# Patient Record
Sex: FEMALE | Race: WHITE | NOT HISPANIC OR LATINO | Employment: OTHER | URBAN - METROPOLITAN AREA
[De-identification: names, ages, dates, MRNs, and addresses within clinical notes are randomized per-mention and may not be internally consistent; named-entity substitution may affect disease eponyms.]

---

## 2017-07-21 ENCOUNTER — TELEPHONE (OUTPATIENT)
Dept: OBSTETRICS AND GYNECOLOGY | Facility: CLINIC | Age: 72
End: 2017-07-21

## 2017-07-21 DIAGNOSIS — Z12.31 ENCOUNTER FOR SCREENING MAMMOGRAM FOR BREAST CANCER: Primary | ICD-10-CM

## 2017-07-21 NOTE — TELEPHONE ENCOUNTER
----- Message from Rebekah Mason sent at 7/21/2017  1:04 PM CDT -----  Contact: Patient   Patient needs orders in for Mammogram to be schedule, Please call her at [phone].    Thanks  td

## 2017-08-15 ENCOUNTER — OFFICE VISIT (OUTPATIENT)
Dept: OTOLARYNGOLOGY | Facility: CLINIC | Age: 72
End: 2017-08-15
Payer: MEDICARE

## 2017-08-15 VITALS
BODY MASS INDEX: 25.43 KG/M2 | HEART RATE: 63 BPM | DIASTOLIC BLOOD PRESSURE: 71 MMHG | WEIGHT: 148.13 LBS | SYSTOLIC BLOOD PRESSURE: 125 MMHG

## 2017-08-15 DIAGNOSIS — H61.22 IMPACTED CERUMEN OF LEFT EAR: ICD-10-CM

## 2017-08-15 DIAGNOSIS — K12.0 APHTHOUS ULCER OF MOUTH: Primary | ICD-10-CM

## 2017-08-15 PROCEDURE — 1159F MED LIST DOCD IN RCRD: CPT | Mod: S$GLB,,, | Performed by: PHYSICIAN ASSISTANT

## 2017-08-15 PROCEDURE — 99999 PR PBB SHADOW E&M-EST. PATIENT-LVL III: CPT | Mod: PBBFAC,,, | Performed by: PHYSICIAN ASSISTANT

## 2017-08-15 PROCEDURE — 69210 REMOVE IMPACTED EAR WAX UNI: CPT | Mod: S$GLB,,, | Performed by: PHYSICIAN ASSISTANT

## 2017-08-15 PROCEDURE — 3074F SYST BP LT 130 MM HG: CPT | Mod: S$GLB,,, | Performed by: PHYSICIAN ASSISTANT

## 2017-08-15 PROCEDURE — 3078F DIAST BP <80 MM HG: CPT | Mod: S$GLB,,, | Performed by: PHYSICIAN ASSISTANT

## 2017-08-15 PROCEDURE — 99213 OFFICE O/P EST LOW 20 MIN: CPT | Mod: 25,S$GLB,, | Performed by: PHYSICIAN ASSISTANT

## 2017-08-15 PROCEDURE — 3008F BODY MASS INDEX DOCD: CPT | Mod: S$GLB,,, | Performed by: PHYSICIAN ASSISTANT

## 2017-08-15 PROCEDURE — 1125F AMNT PAIN NOTED PAIN PRSNT: CPT | Mod: S$GLB,,, | Performed by: PHYSICIAN ASSISTANT

## 2017-08-15 RX ORDER — TRIAMCINOLONE ACETONIDE 1 MG/G
PASTE DENTAL
Qty: 5 G | Refills: 3 | Status: SHIPPED | OUTPATIENT
Start: 2017-08-15

## 2017-08-15 RX ORDER — PAROXETINE 10 MG/1
10 TABLET, FILM COATED ORAL EVERY MORNING
COMMUNITY

## 2017-08-28 ENCOUNTER — HOSPITAL ENCOUNTER (OUTPATIENT)
Dept: RADIOLOGY | Facility: HOSPITAL | Age: 72
Discharge: HOME | End: 2017-08-28
Attending: OBSTETRICS & GYNECOLOGY
Payer: MEDICARE

## 2017-08-28 ENCOUNTER — OFFICE VISIT (OUTPATIENT)
Dept: OBSTETRICS AND GYNECOLOGY | Facility: CLINIC | Age: 72
End: 2017-08-28
Payer: MEDICARE

## 2017-08-28 VITALS
SYSTOLIC BLOOD PRESSURE: 116 MMHG | DIASTOLIC BLOOD PRESSURE: 70 MMHG | BODY MASS INDEX: 25.56 KG/M2 | HEIGHT: 64 IN | WEIGHT: 149.69 LBS

## 2017-08-28 DIAGNOSIS — Z01.419 WELL WOMAN EXAM WITH ROUTINE GYNECOLOGICAL EXAM: Primary | ICD-10-CM

## 2017-08-28 DIAGNOSIS — Z12.31 ENCOUNTER FOR SCREENING MAMMOGRAM FOR BREAST CANCER: ICD-10-CM

## 2017-08-28 PROCEDURE — G0101 CA SCREEN;PELVIC/BREAST EXAM: HCPCS | Mod: S$GLB,,, | Performed by: OBSTETRICS & GYNECOLOGY

## 2017-08-28 PROCEDURE — 77063 BREAST TOMOSYNTHESIS BI: CPT | Mod: 26,,, | Performed by: RADIOLOGY

## 2017-08-28 PROCEDURE — 99999 PR PBB SHADOW E&M-EST. PATIENT-LVL II: CPT | Mod: PBBFAC,,, | Performed by: OBSTETRICS & GYNECOLOGY

## 2017-08-28 PROCEDURE — 77067 SCR MAMMO BI INCL CAD: CPT | Mod: 26,,, | Performed by: RADIOLOGY

## 2017-08-28 PROCEDURE — 77067 SCR MAMMO BI INCL CAD: CPT | Mod: TC

## 2017-08-28 RX ORDER — BETAMETHASONE DIPROPIONATE 0.5 MG/G
CREAM TOPICAL
COMMUNITY
Start: 2017-07-08

## 2017-08-28 RX ORDER — MECLIZINE HYDROCHLORIDE 25 MG/1
TABLET ORAL
COMMUNITY
Start: 2017-08-03

## 2017-08-28 NOTE — PROGRESS NOTES
CHIEF COMPLAINT:   Gynecologic Exam  Chief Complaint   Patient presents with    Well Woman       HISTORY OF PRESENT ILLNESS  Patient presents for annual exam. The patient has no complaints today.     No LMP recorded. Patient is postmenopausal.  Postmenopausal    GYN screening history: last Pap: was normal. Never had any abnormal Pap smears in past.      Reports no bowel movement irregularities from baseline, bloating, or weight loss.    HRT:   None        Health Maintenance   Topic Date Due    TETANUS VACCINE  03/14/1962    DEXA SCAN  03/14/1984    Colonoscopy  03/14/1994    Zoster Vaccine  03/14/2004    Pneumococcal (65+) (1 of 2 - PCV13) 03/14/2009    Lipid Panel  11/19/2013    Influenza Vaccine  08/01/2017    Mammogram  08/02/2018       HISTORY  Patient Active Problem List   Diagnosis    Lymphocytic thyroiditis    Dysthymic disorder    Essential hypertension    Goiter    Hypothyroidism    Abnormal finding on thyroid function test    Non-toxic multinodular goiter    Malaise and fatigue       Past Medical History:   Diagnosis Date    Anxiety     Asthma     Bronchitis chronic     Depression     Emphysema of lung     Hypertension     Lung disease     Lymphocytic thyroiditis 2/7/2013    Non-Hodgkin lymphoma        Past Surgical History:   Procedure Laterality Date    BELPHAROPTOSIS REPAIR      BREAST BIOPSY Bilateral     benign    CATARACT EXTRACTION, BILATERAL      LYMPH NODE BIOPSY      TUBAL LIGATION         Family History   Problem Relation Age of Onset    Cancer Sister      breast cancer    Breast cancer Sister     Cancer Brother      multiple myeloma    Heart failure Brother     Cancer Brother     Heart failure Brother     Heart failure Brother     Heart failure Brother     Cancer Sister      breast cancer    Breast cancer Sister     Ovarian cancer Neg Hx        Social History     Social History    Marital status:      Spouse name: N/A    Number of children:  N/A    Years of education: N/A     Social History Main Topics    Smoking status: Former Smoker     Packs/day: 0.50     Years: 25.00     Types: Cigarettes     Quit date: 6/21/1998    Smokeless tobacco: Never Used    Alcohol use No    Drug use: No    Sexual activity: Not Asked      Comment: last sexually active 2011; last nl pap 2013     Other Topics Concern    None     Social History Narrative    None       Current Outpatient Prescriptions   Medication Sig Dispense Refill    ADVAIR DISKUS 250-50 mcg/dose diskus inhaler ONE PUFF TWICE DAILY. 60 each 11    amlodipine (NORVASC) 5 MG tablet Take 5 mg by mouth once daily.      augmented betamethasone dipropionate (DIPROLENE-AF) 0.05 % cream       lorazepam (ATIVAN) 0.5 MG tablet Take 0.5 mg by mouth 2 (two) times daily.  5    multivitamin (ONE DAILY MULTIVITAMIN) per tablet Take 1 tablet by mouth once daily.      paroxetine (PAXIL) 10 MG tablet Take 10 mg by mouth every morning.      triamcinolone acetonide 0.1% (KENALOG) 0.1 % paste Apply to oral ulcers three times a day 5 g 3    calcium carbonate (OS-GISELA) 600 mg (1,500 mg) Tab Take 600 mg by mouth 2 (two) times daily with meals.      fluticasone (FLONASE) 50 mcg/actuation nasal spray       meclizine (ANTIVERT) 25 mg tablet       omeprazole (PRILOSEC) 20 MG capsule Take 1 capsule (20 mg total) by mouth once daily. 90 capsule 4     No current facility-administered medications for this visit.        Review of patient's allergies indicates:   Allergen Reactions    Bactrim [sulfamethoxazole-trimethoprim] Hives    Sulfa (sulfonamide antibiotics) Hives and Other (See Comments)     Mouth ulcers           PHYSICAL EXAM     Vitals:    08/28/17 1328   BP: 116/70       PAIN SCALE: 0/10 None    ROS:  GENERAL: No fever, chills, fatigability or weight loss.  ABDOMEN: Appetite fine. No weight loss. Denies diarrhea, abdominal pain, hematemesis or blood in stool.  No change in bowel movement pattern.  URINARY: No  flank pain, dysuria or hematuria.  REPRODUCTIVE: No abnormal vaginal bleeding.  BREASTS: Breasts symmetric, nontender and no lumps detected.    PE:   APPEARANCE: Well nourished, well developed, in no acute distress.  NECK: Neck symmetric without masses or thyromegaly.    NODES: No inguinal lymph node enlargement.  ABDOMEN: Soft. No tenderness or masses. No hepatosplenomegaly. No hernias.  BREASTS: Symmetrical, no skin changes or visible lesions. No palpable masses, nipple discharge or adenopathy bilaterally.    PELVIC:   VULVA: No lesions.  Atrophic but normal female genitalia.  URETHRAL MEATUS: Normal size and location, no lesions, no prolapse.  URETHRA: No masses, tenderness, prolapse or scarring.  VAGINA: dry and narrow w/ atrophy, no discharge, no significant cystocele or rectocele.  CERVIX: No lesions, normal diameter, no stenosis, no cervical motion tenderness.  UTERUS: 8 week size, regular shape, mobile, non-tender, normal position, good support.  ADNEXA: No masses, tenderness or CDS nodularity.  ANUS PERINEUM: No lesions, no relaxation, external hemorrhoids noted.       DIAGNOSIS:   1. Normal gyn exam  2.  Physiologic atrophy    PLAN:   Mammogram    Colonoscopy  dexa      MEDICATIONS PRESCRIBED:   Calcium vitamin D and weight bearing exercise    COUNSELING:  Patient was counseled today on A.C.S. Pap guidelines and recommendations for yearly pelvic exams, mammograms and monthly self breast exams; to see her PCP for other health maintenance.     FOLLOW-UP: With me in 1 year.

## 2018-02-19 ENCOUNTER — HOSPITAL ENCOUNTER (EMERGENCY)
Facility: HOSPITAL | Age: 73
Discharge: HOME | End: 2018-02-19
Payer: MEDICARE

## 2018-02-19 ENCOUNTER — TELEPHONE (OUTPATIENT)
Dept: PULMONOLOGY | Facility: CLINIC | Age: 73
End: 2018-02-19

## 2018-02-19 VITALS
DIASTOLIC BLOOD PRESSURE: 71 MMHG | SYSTOLIC BLOOD PRESSURE: 129 MMHG | BODY MASS INDEX: 24.92 KG/M2 | HEIGHT: 64 IN | TEMPERATURE: 98 F | OXYGEN SATURATION: 96 % | HEART RATE: 72 BPM | WEIGHT: 146 LBS | RESPIRATION RATE: 16 BRPM

## 2018-02-19 DIAGNOSIS — R05.9 COUGH: Primary | ICD-10-CM

## 2018-02-19 DIAGNOSIS — M54.9 BACK PAIN: ICD-10-CM

## 2018-02-19 DIAGNOSIS — S22.089A CLOSED FRACTURE OF TWELFTH THORACIC VERTEBRA, UNSPECIFIED FRACTURE MORPHOLOGY, INITIAL ENCOUNTER: Primary | ICD-10-CM

## 2018-02-19 PROCEDURE — 99284 EMERGENCY DEPT VISIT MOD MDM: CPT

## 2018-02-19 RX ORDER — LEVOFLOXACIN 500 MG/1
500 TABLET, FILM COATED ORAL DAILY
Qty: 7 TABLET | Refills: 0 | Status: SHIPPED | OUTPATIENT
Start: 2018-02-19 | End: 2018-02-24

## 2018-02-19 RX ORDER — HYDROCODONE BITARTRATE AND ACETAMINOPHEN 10; 325 MG/1; MG/1
1 TABLET ORAL EVERY 4 HOURS PRN
Qty: 12 TABLET | Refills: 0 | Status: SHIPPED | OUTPATIENT
Start: 2018-02-19 | End: 2018-03-01

## 2018-02-19 NOTE — ED PROVIDER NOTES
SCRIBE #1 NOTE: I, Milind Sinclair, am scribing for, and in the presence of, JOESPH Arriaga. I have scribed the entire note.      History      Chief Complaint   Patient presents with    Fall     patient states she fell last week and was seen at the after hours clinic, c/o low back pain -LOC       Review of patient's allergies indicates:   Allergen Reactions    Bactrim [sulfamethoxazole-trimethoprim] Hives    Sulfa (sulfonamide antibiotics) Hives and Other (See Comments)     Mouth ulcers        HPI   HPI    2/19/2018, 3:51 PM   History obtained from the patient      History of Present Illness: Sherice Toledo is a 72 y.o. female patient who presents to the Emergency Department for lower back pain which onset gradually six days ago. Pt reports falling six days ago and being tx at after hours clinic. Pt reports increase pain one day ago. Symptoms are constant and moderate in severity. No mitigating or exacerbating factors reported. No associated sxs reported. Patient denies any fever, chills, SOB, CP, abd pain, n/v, neck pain/stiffness, HA, dizziness, light-headedness, LOC, extremity weakness/numbness, bowel/bladder incontinence, and all other sxs at this time. No prior Tx reported. No further complaints or concerns at this time.         Arrival mode: Personal vehicle    PCP: Chris Dorado MD       Past Medical History:  Past Medical History:   Diagnosis Date    Anxiety     Asthma     Bronchitis chronic     Depression     Emphysema of lung     Hypertension     Lung disease     Lymphocytic thyroiditis 2/7/2013    Non-Hodgkin lymphoma        Past Surgical History:  Past Surgical History:   Procedure Laterality Date    BELPHAROPTOSIS REPAIR      BREAST BIOPSY Bilateral     benign    CATARACT EXTRACTION, BILATERAL      LYMPH NODE BIOPSY      TUBAL LIGATION           Family History:  Family History   Problem Relation Age of Onset    Cancer Sister      breast cancer    Breast cancer Sister      Cancer Brother      multiple myeloma    Heart failure Brother     Cancer Brother     Heart failure Brother     Heart failure Brother     Heart failure Brother     Cancer Sister      breast cancer    Breast cancer Sister     Ovarian cancer Neg Hx        Social History:  Social History     Social History Main Topics    Smoking status: Former Smoker     Packs/day: 0.50     Years: 25.00     Types: Cigarettes     Quit date: 6/21/1998    Smokeless tobacco: Never Used    Alcohol use No    Drug use: No    Sexual activity: Unknown      Comment: last sexually active 2011; last nl pap 2013       ROS   Review of Systems   Constitutional: Negative for chills and fever.   HENT: Negative for sore throat.    Respiratory: Negative for shortness of breath.    Cardiovascular: Negative for chest pain.   Gastrointestinal: Negative for abdominal pain, diarrhea, nausea and vomiting.   Genitourinary: Negative for dysuria.   Musculoskeletal: Positive for back pain. Negative for gait problem, neck pain and neck stiffness.   Skin: Negative for rash.   Neurological: Negative for dizziness, syncope, weakness, light-headedness, numbness and headaches.        (-) Bowel/Bladder incontinence   Hematological: Does not bruise/bleed easily.   All other systems reviewed and are negative.    Physical Exam      Initial Vitals [02/19/18 1449]   BP Pulse Resp Temp SpO2   (!) 143/65 89 16 97.8 °F (36.6 °C) 95 %      MAP       91          Physical Exam  Nursing Notes and Vital Signs Reviewed.  Constitutional: Patient is in no acute distress. Well-developed and well-nourished.  Head: Atraumatic. Normocephalic.  Eyes: PERRL. EOM intact. Conjunctivae are not pale. No scleral icterus.  ENT: Mucous membranes are moist. Oropharynx is clear and symmetric.    Neck: Supple. Full ROM. No lymphadenopathy.  Cardiovascular: Regular rate. Regular rhythm. No murmurs, rubs, or gallops. Distal pulses are 2+ and symmetric.  Pulmonary/Chest: No respiratory  "distress. Clear to auscultation bilaterally. No wheezing or rales.  Abdominal: Soft and non-distended.  There is no tenderness.  No rebound, guarding, or rigidity. Good bowel sounds.  Genitourinary: No CVA tenderness  Musculoskeletal: Moves all extremities. No obvious deformities. No edema. No calf tenderness.  Back: Paraspinal lumbar tenderness. No midline bony tenderness, deformities, or step-offs of the T-spine or L-spine. Skin appears normal without abrasions or bruising. No erythema, induration, or fluctuance.   Skin: Warm and dry.  Neurological:  Alert, awake, and appropriate.  Normal speech.  No acute focal neurological deficits are appreciated.  Psychiatric: Normal affect. Good eye contact. Appropriate in content.    ED Course    Procedures  ED Vital Signs:  Vitals:    02/19/18 1449   BP: (!) 143/65   Pulse: 89   Resp: 16   Temp: 97.8 °F (36.6 °C)   SpO2: 95%   Weight: 66.2 kg (146 lb)   Height: 5' 4" (1.626 m)       Imaging Results:  Imaging Results          X-Ray Lumbar Spine Ap And Lateral (Final result)  Result time 02/19/18 16:17:23    Final result by Ryan Campos III, MD (02/19/18 16:17:23)                 Impression:        T12 anterior compression fracture, new since 2016. Lumbar scoliosis and advanced multilevel lumbar spondylosis.      Electronically signed by: RYAN CAMPOS MD  Date:     02/19/18  Time:    16:17              Narrative:    XR LUMBAR SPINE AP AND LATERAL    Clinical Indication: Back Pain .     Findings: There is an anterior compression fracture of the T12 vertebral body with approximately 50% loss of vertebral body height which appears new compared to prior lateral chest x-ray from February 2016.  No other fracture identified.  Lumbar spine alignment is normal.  There is a moderate lumbar dextroscoliosis.  There is advanced L2-3 through L5-S1 degenerative disc disease with significant disc height loss, intradiscal vacuum phenomenon and circumferential endplate osteophytes.  There " is multilevel hypertrophic facet arthropathy throughout the lumbar spine. The visualized sacrum and sacroiliac joints appear intact.                                      The Emergency Provider reviewed the vital signs and test results, which are outlined above.    ED Discussion     4:35 PM: JOESPH Arriaga, discussed the pt's case with Dr. García (Ortho) who recommends pt f/u in clinic sometime this week.    4:36 PM: Reassessed pt at this time.  Pt states her condition has improved at this time. Discussed with pt all pertinent ED information and results. Discussed pt dx and plan of tx. Gave pt all f/u and return to the ED instructions. All questions and concerns were addressed at this time. Pt expresses understanding of information and instructions, and is comfortable with plan to discharge. Pt is stable for discharge.    Trauma precautions were discussed with patient and/or family/caretaker; I do not specifically detect any abdominal, thoracic, CNS, orthopedic, or other emergent or life threatening condition and that patient is safe to be discharged.  It was also discussed that despite an unrevealing examination and negative radiographic examination for serious or life threatening injury, these conditions may still exist.  As such, patient should return to ED immediately should they experience, severe or worsening pain, shortness of breath, abdominal pain, headache, vomiting, or any other concern.  It was also discussed that not infrequently, injuries may not be diagnosed during the initial ED visit (such as fractures) and that if the patient discovers a new area of concern, a new area of injury that was not evaluated in the ED, they should return for evaluation as they may have an injury that requires treatment.      ED Medication(s):  Medications - No data to display    New Prescriptions    HYDROCODONE-ACETAMINOPHEN 10-325MG (NORCO)  MG TAB    Take 1 tablet by mouth every 4 (four) hours as needed.     LEVOFLOXACIN (LEVAQUIN) 500 MG TABLET    Take 1 tablet (500 mg total) by mouth once daily.       Follow-up Information     Chris Dorado MD. Go in 2 days.    Specialty:  Internal Medicine  Contact information:  [address] [place] [id number] [phone]                     Medical Decision Making              Scribe Attestation:   Scribe #1: I performed the above scribed service and the documentation accurately describes the services I performed. I attest to the accuracy of the note.    Attending:   Physician Attestation Statement for Scribe #1: I, JOESPH Arriaga, personally performed the services described in this documentation, as scribed by Milind Sinclair, in my presence, and it is both accurate and complete.          Clinical Impression       ICD-10-CM ICD-9-CM   1. Closed fracture of twelfth thoracic vertebra, unspecified fracture morphology, initial encounter S22.089A 805.2   2. Back pain M54.9 724.5       Disposition:   Disposition: Discharged  Condition: Stable         JOESPH Arriaga  02/26/18 0917

## 2018-10-02 DIAGNOSIS — Z12.39 BREAST CANCER SCREENING: Primary | ICD-10-CM

## 2020-01-14 NOTE — PROGRESS NOTES
Subjective:       Patient ID: Sherice Toledo is a 72 y.o. female.    Chief Complaint: Cerumen Impaction (per pcp) and Mouth Lesions    Patient is a very pleasant 72 year old female here to see me today for the first time for evaluation of oral ulcers and suspected left cerumen impaction.  She reports ulcers in her mouth, on and off for the past 4 weeks.  She has been using OTC mouth spray and mouthwash with little relief.  She has been drinking lots of orange juice over the past month.  They are painful with eating and drinking.  Denies any drainage in her mouth.  She has had ulcers in the past.  She also reports suspected cerumen impaction in her left ear.  She hears bubbles at times on the left.  Denies ear pain or drainage.  She has not noticed any recent change in her hearing but her family has expressed their concerns.  She has not been using drops to soften the wax.  She was last here in 2014 with Dr. Neves for ear cleaning.      Review of Systems   Constitutional: Positive for fatigue. Negative for fever.   HENT: Positive for dental problem (oral ulcers) and hearing loss (left ear stopped up). Negative for ear discharge, ear pain, postnasal drip, rhinorrhea and tinnitus.    Respiratory: Positive for cough and shortness of breath (with exertion).    Cardiovascular: Positive for palpitations (always; stable).   Gastrointestinal: Negative for diarrhea and vomiting.   Neurological: Negative for dizziness.   Psychiatric/Behavioral: Positive for sleep disturbance.       Objective:      Physical Exam   Constitutional: She is oriented to person, place, and time. She appears well-developed and well-nourished. No distress.   HENT:   Head: Normocephalic and atraumatic.   Right Ear: Tympanic membrane, external ear and ear canal normal.   Left Ear: Tympanic membrane, external ear and ear canal normal.   Nose: Septal deviation (to the left) present. No mucosal edema, rhinorrhea or nasal deformity. No epistaxis. Right  sinus exhibits no maxillary sinus tenderness and no frontal sinus tenderness. Left sinus exhibits no maxillary sinus tenderness and no frontal sinus tenderness.   Mouth/Throat: Uvula is midline and mucous membranes are normal. Mucous membranes are not pale and not dry. Oral lesions (several shallow grayish-white ulcers with erythematous border on buccal mucosa, left lateral tongue and lower inner lip ) present. No uvula swelling or dental caries. No oropharyngeal exudate, posterior oropharyngeal edema or posterior oropharyngeal erythema.   Left cerumen impaction, removal described below   Eyes: Conjunctivae, EOM and lids are normal. Pupils are equal, round, and reactive to light. Right eye exhibits no chemosis. Left eye exhibits no chemosis. Right conjunctiva is not injected. Left conjunctiva is not injected. No scleral icterus. Right eye exhibits normal extraocular motion and no nystagmus. Left eye exhibits normal extraocular motion and no nystagmus.   eyeglasses   Neck: Trachea normal and phonation normal. No tracheal tenderness present. No tracheal deviation present. No thyroid mass and no thyromegaly present.   Cardiovascular: Intact distal pulses.    Pulmonary/Chest: Effort normal. No stridor. No respiratory distress.   Abdominal: She exhibits no distension.   Lymphadenopathy:        Head (right side): No submental, no submandibular, no preauricular and no posterior auricular adenopathy present.        Head (left side): No submental, no submandibular, no preauricular and no posterior auricular adenopathy present.     She has no cervical adenopathy.   Neurological: She is alert and oriented to person, place, and time. No cranial nerve deficit.   Skin: Skin is warm and dry. No rash noted. No erythema.   Psychiatric: She has a normal mood and affect. Her behavior is normal.         Procedure Note    CHIEF COMPLAINT:  Cerumen Impaction    Description:  The patient was seated in an exam chair.  An ear speculum was  placed in the left EAC and was examined under the microscope.  Suction and/or loop curettes were used to remove a large cerumen impaction.  The tympanic membrane was visualized and was normal in appearance.  The patient tolerated the procedure well.      Assessment:       1. Aphthous ulcer of mouth    2. Impacted cerumen of left ear        Plan:           Discussed with the patient that they have aphthous ulcers.  This is a relatively common diagnosis, and recurrence is common.  I would recommend avoiding any sharp foods, and using a small-headed soft toothbrush to avoid any trauma to the oral mucosa.  There is some research to suggest sodium lauryl sulfate to be implicated in this disease process, so may wish to switch to a natural toothpaste that does not contain this chemical.  I would also recommend taking a daily multivitamin, as underlying deficiencies can also lead to oral ulcers.  I gave the patient a prescription for topical Kenalog to be used three times daily for active ulcers.     Cerumen impaction:  Removed today without difficulty.  I would recommend the use of a wax softening drop, either over the counter Debrox or mineral oil, on a weekly basis.  I also instructed the patient to avoid Qtips.     Is This A New Presentation, Or A Follow-Up?: Skin Lesions What Type Of Note Output Would You Prefer (Optional)?: Bullet Format How Severe Is Your Skin Lesion?: moderate Has Your Skin Lesion Been Treated?: not been treated